# Patient Record
Sex: FEMALE | Race: OTHER | URBAN - METROPOLITAN AREA
[De-identification: names, ages, dates, MRNs, and addresses within clinical notes are randomized per-mention and may not be internally consistent; named-entity substitution may affect disease eponyms.]

---

## 2019-04-10 ENCOUNTER — EMERGENCY (EMERGENCY)
Facility: HOSPITAL | Age: 54
LOS: 1 days | Discharge: HOME | End: 2019-04-10
Attending: EMERGENCY MEDICINE
Payer: MEDICAID

## 2019-04-10 VITALS
TEMPERATURE: 98 F | SYSTOLIC BLOOD PRESSURE: 108 MMHG | HEART RATE: 74 BPM | DIASTOLIC BLOOD PRESSURE: 72 MMHG | RESPIRATION RATE: 17 BRPM | OXYGEN SATURATION: 100 %

## 2019-04-10 VITALS
WEIGHT: 160.94 LBS | SYSTOLIC BLOOD PRESSURE: 112 MMHG | HEIGHT: 62 IN | DIASTOLIC BLOOD PRESSURE: 70 MMHG | OXYGEN SATURATION: 100 % | RESPIRATION RATE: 16 BRPM | HEART RATE: 80 BPM | TEMPERATURE: 98 F

## 2019-04-10 LAB
ALBUMIN SERPL ELPH-MCNC: 3.8 G/DL — SIGNIFICANT CHANGE UP (ref 3.5–5)
ALP SERPL-CCNC: 101 U/L — SIGNIFICANT CHANGE UP (ref 40–120)
ALT FLD-CCNC: 42 U/L DA — SIGNIFICANT CHANGE UP (ref 10–60)
ANION GAP SERPL CALC-SCNC: 6 MMOL/L — SIGNIFICANT CHANGE UP (ref 5–17)
AST SERPL-CCNC: 28 U/L — SIGNIFICANT CHANGE UP (ref 10–40)
BASOPHILS # BLD AUTO: 0.03 K/UL — SIGNIFICANT CHANGE UP (ref 0–0.2)
BASOPHILS NFR BLD AUTO: 0.5 % — SIGNIFICANT CHANGE UP (ref 0–2)
BILIRUB SERPL-MCNC: 0.5 MG/DL — SIGNIFICANT CHANGE UP (ref 0.2–1.2)
BUN SERPL-MCNC: 15 MG/DL — SIGNIFICANT CHANGE UP (ref 7–18)
CALCIUM SERPL-MCNC: 8.5 MG/DL — SIGNIFICANT CHANGE UP (ref 8.4–10.5)
CHLORIDE SERPL-SCNC: 109 MMOL/L — HIGH (ref 96–108)
CK SERPL-CCNC: 118 U/L — SIGNIFICANT CHANGE UP (ref 21–215)
CO2 SERPL-SCNC: 25 MMOL/L — SIGNIFICANT CHANGE UP (ref 22–31)
CREAT SERPL-MCNC: 0.81 MG/DL — SIGNIFICANT CHANGE UP (ref 0.5–1.3)
EOSINOPHIL # BLD AUTO: 0.16 K/UL — SIGNIFICANT CHANGE UP (ref 0–0.5)
EOSINOPHIL NFR BLD AUTO: 2.6 % — SIGNIFICANT CHANGE UP (ref 0–6)
GLUCOSE SERPL-MCNC: 110 MG/DL — HIGH (ref 70–99)
HCT VFR BLD CALC: 40.5 % — SIGNIFICANT CHANGE UP (ref 34.5–45)
HGB BLD-MCNC: 13.3 G/DL — SIGNIFICANT CHANGE UP (ref 11.5–15.5)
IMM GRANULOCYTES NFR BLD AUTO: 0.3 % — SIGNIFICANT CHANGE UP (ref 0–1.5)
LYMPHOCYTES # BLD AUTO: 2.41 K/UL — SIGNIFICANT CHANGE UP (ref 1–3.3)
LYMPHOCYTES # BLD AUTO: 38.9 % — SIGNIFICANT CHANGE UP (ref 13–44)
MCHC RBC-ENTMCNC: 28.5 PG — SIGNIFICANT CHANGE UP (ref 27–34)
MCHC RBC-ENTMCNC: 32.8 GM/DL — SIGNIFICANT CHANGE UP (ref 32–36)
MCV RBC AUTO: 86.7 FL — SIGNIFICANT CHANGE UP (ref 80–100)
MONOCYTES # BLD AUTO: 0.44 K/UL — SIGNIFICANT CHANGE UP (ref 0–0.9)
MONOCYTES NFR BLD AUTO: 7.1 % — SIGNIFICANT CHANGE UP (ref 2–14)
NEUTROPHILS # BLD AUTO: 3.14 K/UL — SIGNIFICANT CHANGE UP (ref 1.8–7.4)
NEUTROPHILS NFR BLD AUTO: 50.6 % — SIGNIFICANT CHANGE UP (ref 43–77)
NRBC # BLD: 0 /100 WBCS — SIGNIFICANT CHANGE UP (ref 0–0)
PLATELET # BLD AUTO: 241 K/UL — SIGNIFICANT CHANGE UP (ref 150–400)
POTASSIUM SERPL-MCNC: 4.1 MMOL/L — SIGNIFICANT CHANGE UP (ref 3.5–5.3)
POTASSIUM SERPL-SCNC: 4.1 MMOL/L — SIGNIFICANT CHANGE UP (ref 3.5–5.3)
PROT SERPL-MCNC: 7.6 G/DL — SIGNIFICANT CHANGE UP (ref 6–8.3)
RBC # BLD: 4.67 M/UL — SIGNIFICANT CHANGE UP (ref 3.8–5.2)
RBC # FLD: 12.9 % — SIGNIFICANT CHANGE UP (ref 10.3–14.5)
SODIUM SERPL-SCNC: 140 MMOL/L — SIGNIFICANT CHANGE UP (ref 135–145)
TROPONIN I SERPL-MCNC: <0.015 NG/ML — SIGNIFICANT CHANGE UP (ref 0–0.04)
WBC # BLD: 6.2 K/UL — SIGNIFICANT CHANGE UP (ref 3.8–10.5)
WBC # FLD AUTO: 6.2 K/UL — SIGNIFICANT CHANGE UP (ref 3.8–10.5)

## 2019-04-10 PROCEDURE — 70450 CT HEAD/BRAIN W/O DYE: CPT

## 2019-04-10 PROCEDURE — 99284 EMERGENCY DEPT VISIT MOD MDM: CPT | Mod: 25

## 2019-04-10 PROCEDURE — 93005 ELECTROCARDIOGRAM TRACING: CPT

## 2019-04-10 PROCEDURE — 70450 CT HEAD/BRAIN W/O DYE: CPT | Mod: 26

## 2019-04-10 PROCEDURE — 82550 ASSAY OF CK (CPK): CPT

## 2019-04-10 PROCEDURE — 85027 COMPLETE CBC AUTOMATED: CPT

## 2019-04-10 PROCEDURE — 36415 COLL VENOUS BLD VENIPUNCTURE: CPT

## 2019-04-10 PROCEDURE — 84484 ASSAY OF TROPONIN QUANT: CPT

## 2019-04-10 PROCEDURE — 80053 COMPREHEN METABOLIC PANEL: CPT

## 2019-04-10 PROCEDURE — 99283 EMERGENCY DEPT VISIT LOW MDM: CPT

## 2019-04-10 RX ORDER — ACETAMINOPHEN 500 MG
975 TABLET ORAL ONCE
Qty: 0 | Refills: 0 | Status: COMPLETED | OUTPATIENT
Start: 2019-04-10 | End: 2019-04-10

## 2019-04-10 RX ADMIN — Medication 975 MILLIGRAM(S): at 14:01

## 2019-04-10 RX ADMIN — Medication 975 MILLIGRAM(S): at 13:55

## 2019-04-10 NOTE — ED ADULT NURSE NOTE - OBJECTIVE STATEMENT
alert and verbally responsive c/o Lt sided headache 7/10, numbness stated symptom started last  Friday worsen last nigh seen by MD, CT completed

## 2019-04-10 NOTE — ED ADULT NURSE NOTE - NSIMPLEMENTINTERV_GEN_ALL_ED
Implemented All Universal Safety Interventions:  [place] to call system. Call bell, personal items and telephone within reach. Instruct patient to call for assistance. Room bathroom lighting operational. Non-slip footwear when patient is off stretcher. Physically safe environment: no spills, clutter or unnecessary equipment. Stretcher in lowest position, wheels locked, appropriate side rails in place.

## 2019-04-10 NOTE — ED PROVIDER NOTE - OBJECTIVE STATEMENT
54 y/o F with no significant PMHx or PSHx presents to the ED with complaints of L facial paralysis x 2 days. Patient notes associated eye irritation with L sided headache and ear pain. Denies any other acute complaints.

## 2019-04-10 NOTE — ED PROVIDER NOTE - PHYSICAL EXAMINATION
L facial paralysis including forehead. Arms and legs with normal strength and sensation. L facial weakness  including forehead. Arms and legs with normal strength and sensation. ambulatory with steady gait, awake alert NAD.

## 2020-04-06 NOTE — ED ADULT NURSE NOTE - NS_NURSE_DISC_ED_ALL_ED_PROVIDEDBY
ED MD

## 2020-08-13 NOTE — ED PROVIDER NOTE - PMH
Cholecalciferol (VITAMIN D3) 1.25 MG (46673 UT) CAPS TAKE 1 CAPSULE BY MOUTH EVERY OTHER WEEK Yes Matthieu Levi MD   predniSONE (DELTASONE) 2.5 MG tablet Take 1 tab daily with 5 mg tab for 7.5 mg Yes Sol Machuca MD   mirtazapine (REMERON) 7.5 MG tablet Take 1 tablet by mouth nightly Yes Matthieu Levi MD   pantoprazole (PROTONIX) 20 MG tablet TAKE 1 TABLET BY MOUTH TWICE DAILY Yes Matthieu Levi MD   rosuvastatin (CRESTOR) 20 MG tablet TAKE 1 TABLET BY MOUTH DAILY Yes Matthieu Levi MD   carvedilol (COREG) 6.25 MG tablet Take 1 tablet by mouth 2 times daily Yes Matthieu Levi MD   ondansetron (ZOFRAN) 4 MG tablet TAKE 1 TABLET BY MOUTH EVERY 12 HOURS AS NEEDED FOR NAUSEA OR VOMITING Yes Matthieu Levi MD   buPROPion (WELLBUTRIN XL) 300 MG extended release tablet TAKE 1 TABLET BY MOUTH EVERY MORNING Yes Griselda Mireles MD   predniSONE (DELTASONE) 5 MG tablet Take 3 tablets by mouth daily for 14 days, THEN 2 tablets daily. TAKE 2 TABLETS BY MOUTH DAILY. Patient taking differently: 5mg Yes Matthieu Levi MD   docusate (COLACE, DULCOLAX) 100 MG CAPS Take 100 mg by mouth 2 times daily Yes Najma Casillas DO   clobetasol (TEMOVATE) 0.05 % external solution Apply topically 2 times daily Apply topically 2 times daily. Yes Paz Cordoba MD   acetaminophen 650 MG TABS Take 650 mg by mouth every 6 hours as needed for Pain or Fever (Fever >100.5 F (38 C)). Yes Franci Greer MD       Social History     Tobacco Use    Smoking status: Former Smoker     Packs/day: 0.50     Years: 30.00     Pack years: 15.00     Types: Cigarettes     Start date:      Last attempt to quit: 2014     Years since quittin.8    Smokeless tobacco: Never Used   Substance Use Topics    Alcohol use:  Yes     Alcohol/week: 0.0 standard drinks     Comment: 2 cocktails on weekends    Drug use: No        Past Medical History:   Diagnosis Date    Anxiety     CAD (coronary artery disease)     CHF (congestive heart failure) (HCC)     Depression     DVT of lower extremity (deep venous thrombosis) (HCC)     Falls     Hypertension     Lupus (HCC)     TIA (transient ischemic attack)     x3    Urinary incontinence     UTI (urinary tract infection)        PHYSICAL EXAMINATION:  [ INSTRUCTIONS:  \"[x]\" Indicates a positive item  \"[]\" Indicates a negative item  -- DELETE ALL ITEMS NOT EXAMINED]  Vital Signs: (As obtained by patient/caregiver or practitioner observation)    Blood pressure- as above Heart rate-    Respiratory rate-    Temperature-  Pulse oximetry-     Mental status  [x] Alert and awake  [x] Oriented to person/place/time []Able to follow commands      Pulmonary/Chest: [x] Respiratory effort normal.  [x] No visualized signs of difficulty breathing or respiratory distress        [] Abnormal-     Psychiatric:       [x] Normal Affect [x] No Hallucinations        [] Abnormal-     Other pertinent observable physical exam findings-     ASSESSMENT/PLAN:  1. Essential hypertension, benign  BP is low today. I would like to see the patient. Patient states that she feels okay except for weight gain  I asked the caregiver to recheck blood pressure today and call me with the results. Asked him not to give the patient more Lasix until I see her. We discussed the option of going to the ER but according to patient's caregiver they would like to wait and I am okay with that since I will see her tomorrow. Weight gain may be secondary to mirtazapine in that case we will discuss whether patient is willing to accept weight gain with the benefits of the mirtazapine    2. Acute on chronic heart failure with preserved ejection fraction ([address]  Patient gained 5 pounds. No leg swelling per pt  Will reassess tomorrow to see if there is any fluid retention      No follow-ups on file.     Jorge Alberto Amador is a 66 y.o. female being evaluated by a Virtual Visit (video visit) encounter to address concerns as mentioned above. A caregiver was present when appropriate. Due to this being a TeleHealth encounter (During IVDLN-52 public health emergency), evaluation of the following organ systems was limited: Vitals/Constitutional/EENT/Resp/CV/GI//MS/Neuro/Skin/Heme-Lymph-Imm. Pursuant to the emergency declaration under the [address] [place] [address], [address] [place] [address] and the [employer] Act, this Virtual Visit was conducted with patient's (and/or legal guardian's) consent, to reduce the patient's risk of exposure to COVID-19 and provide necessary medical care. The patient (and/or legal guardian) has also been advised to contact this office for worsening conditions or problems, and seek emergency medical treatment and/or call 911 if deemed necessary. Patient identification was verified at the start of the visit: Yes    Total time spent on this encounter: ~ 5 min    Services were provided through a video synchronous discussion virtually to substitute for in-person clinic visit. Patient and provider were located at their individual homes. --Luciano Starks MD on 8/13/2020 at 8:20 AM    An electronic signature was used to authenticate this note.     BP came latr better  I will see pt tomorrow No pertinent past medical history <<----- Click to add NO pertinent Past Medical History

## 2021-06-14 ENCOUNTER — EMERGENCY (EMERGENCY)
Facility: HOSPITAL | Age: 56
LOS: 1 days | Discharge: HOME | End: 2021-06-14
Attending: STUDENT IN AN ORGANIZED HEALTH CARE EDUCATION/TRAINING PROGRAM
Payer: MEDICAID

## 2021-06-14 VITALS
TEMPERATURE: 98 F | RESPIRATION RATE: 16 BRPM | DIASTOLIC BLOOD PRESSURE: 58 MMHG | OXYGEN SATURATION: 96 % | WEIGHT: 164.91 LBS | HEART RATE: 82 BPM | SYSTOLIC BLOOD PRESSURE: 141 MMHG | HEIGHT: 63 IN

## 2021-06-14 LAB
APPEARANCE UR: CLEAR — SIGNIFICANT CHANGE UP
BILIRUB UR-MCNC: NEGATIVE — SIGNIFICANT CHANGE UP
COLOR SPEC: YELLOW — SIGNIFICANT CHANGE UP
DIFF PNL FLD: ABNORMAL
GLUCOSE UR QL: NEGATIVE — SIGNIFICANT CHANGE UP
HCG UR QL: NEGATIVE — SIGNIFICANT CHANGE UP
KETONES UR-MCNC: NEGATIVE — SIGNIFICANT CHANGE UP
LEUKOCYTE ESTERASE UR-ACNC: ABNORMAL
NITRITE UR-MCNC: NEGATIVE — SIGNIFICANT CHANGE UP
PH UR: 5 — SIGNIFICANT CHANGE UP (ref 5–8)
PROT UR-MCNC: NEGATIVE — SIGNIFICANT CHANGE UP
SP GR SPEC: 1.01 — SIGNIFICANT CHANGE UP (ref 1.01–1.02)
UROBILINOGEN FLD QL: NEGATIVE — SIGNIFICANT CHANGE UP

## 2021-06-14 PROCEDURE — 99284 EMERGENCY DEPT VISIT MOD MDM: CPT

## 2021-06-14 RX ORDER — CEPHALEXIN 500 MG
1 CAPSULE ORAL
Qty: 14 | Refills: 0
Start: 2021-06-14 | End: 2021-06-20

## 2021-06-14 NOTE — ED PROVIDER NOTE - CARE PLAN
Principal Discharge DX:	Urinary tract infection with hematuria, site unspecified  Secondary Diagnosis:	Rash and nonspecific skin eruption

## 2021-06-14 NOTE — ED ADULT TRIAGE NOTE - CCCP TRG CHIEF CMPLNT
c/o back pain /rashes in her abdomen after getting her 2nd vaccine  PFEIZER/pain, low back c/o back pain /rashes in her abdomen after getting her 2nd dose of vaccine PFIZER  ON 5 /8/2021/pain, low back

## 2021-06-14 NOTE — ED PROVIDER NOTE - PATIENT PORTAL LINK FT
You can access the FollowMyHealth Patient Portal offered by [facility] by registering at the following website: [email or web address]. By joining [facility]’s FollowMyHealth portal, you will also be able to view your health information using other applications (apps) compatible with our system.

## 2021-06-14 NOTE — ED PROVIDER NOTE - NS ED ROS FT
(+) rash, low back pain  (-) fevers, chills  (-) dizziness, lightheadedness, vision changes  (-) neck pain, stiffness  (-) cp, palpitations  (-) sob, cough, hemoptysis  (-) abd pain, n/v/d/c   (-) urinary sxs  (-) weakness, paresthesias

## 2021-06-14 NOTE — ED PROVIDER NOTE - PROGRESS NOTE DETAILS
Moderate bacteria on UA, +leuks.  Will treat for UTI. Explained and educated patient on variety of post-vaccine rashes that have been reported at this time. patient instructed to report to national vaccine reaction.  no indication treatment for shingles at this time given not c/w shingles appearance, unchanged for 1 month  instructed for otc topical cream, pmd and derm f/up. patient and family understand and agree with plan. rx for abx sent.

## 2021-06-14 NOTE — ED PROVIDER NOTE - OBJECTIVE STATEMENT
56yo F no significant pmhx p/w rash to abd and low back pain x weeks. Patient states that she received second dose Pfizer vaccine 5/8 and has been experiencing intermittent low back pain and abd rash since then. patient states that she had tingling in R lower abd region of skin w pain and rash since 5/17, went to pmd and was informed it was a reaction to vaccine, has not followed up. patient states that pain and tingling subsided approx 3 weeks ago and rash has been unchanged, minimally pruritic, w moderate improvement in past 3 weeks. patient has not taken anything for the rash or back pain otherwise. patient denies f/c, dizziness, vision changes, neck pain/stiffness, cp, sob, abd pain, n/v/d/c, weakness, urinary sxs.

## 2021-06-14 NOTE — ED ADULT TRIAGE NOTE - CHIEF COMPLAINT QUOTE
c/o back pain /rashes in her abdomen after getting her 2ND VACCINE MAY8 2021 c/o back pain /rashes in her abdomen after getting her 2ND VACCINE PFIZER MAY8 2021

## 2021-06-14 NOTE — ED PROVIDER NOTE - PHYSICAL EXAMINATION
Sohrawardy:   VS reviewed  NAD, not ill-appearing  aaox3  nc/at, neck rom normal  rrr, s1s2  normal resp effort  abdomen soft, nd/nt   no CVA tenderness b/l  +urticaria, 3 small individual papules, R periumbilical region, 3cm, no scaling, peeling, bleeding, nontender, no vesicles, no streaking, no pain or erythema in surrounding region  no neuro deficits

## 2021-06-14 NOTE — ED ADULT NURSE NOTE - OBJECTIVE STATEMENT
54 yo female presents to the ED c/o back pain and rashes on her abdomen x 2 weeks. Patient is unable to sleep because of the pain. Patient verbalized that she had her 2nd dose of COVID-19 vaccine (Pfizer) on 05/08/2021.

## 2021-06-14 NOTE — ED ADULT NURSE NOTE - CCCP TRG CHIEF CMPLNT
c/o back pain /rashes in her abdomen after getting her 2nd dose of vaccine PFIZER  ON 5 /8/2021/pain, low back

## 2021-06-14 NOTE — ED PROVIDER NOTE - CLINICAL SUMMARY MEDICAL DECISION MAKING FREE TEXT BOX
56yo F no significant pmhx, no immunosuppression p/w rash to abd s/p vaccine 5/8 for covid (pfizer) and low back pain  UA + for leuks and moderate bacteria c/w UTI  no CVA tenderness, no f/c, n/v - unlikely pyelo given h&p, will treat  though endorsing initial tingling/pain last month before rash and considered shingles, presentation of rash not c/w shingles, no vesicles, no ttp, unchanged, nonpruritic, nontender rash for 1 month - undifferentiated at this time and no indicated for treatment/steroids or antivirals given ongoing for >3 weeks  patient otherwise well appearing, no s/s systemic infection or pathology requiring emergent intervention

## 2021-06-15 LAB
CULTURE RESULTS: SIGNIFICANT CHANGE UP
SPECIMEN SOURCE: SIGNIFICANT CHANGE UP

## 2022-04-10 ENCOUNTER — TRANSCRIPTION ENCOUNTER (OUTPATIENT)
Age: 57
End: 2022-04-10

## 2022-06-30 PROBLEM — Z78.9 OTHER SPECIFIED HEALTH STATUS: Chronic | Status: ACTIVE | Noted: 2019-04-10

## 2022-11-16 PROBLEM — Z00.00 ENCOUNTER FOR PREVENTIVE HEALTH EXAMINATION: Status: ACTIVE | Noted: 2022-11-16

## 2022-11-17 ENCOUNTER — APPOINTMENT (OUTPATIENT)
Dept: NEUROLOGY | Facility: CLINIC | Age: 57
End: 2022-11-17

## 2022-11-17 VITALS — DIASTOLIC BLOOD PRESSURE: 54 MMHG | SYSTOLIC BLOOD PRESSURE: 122 MMHG | HEART RATE: 83 BPM

## 2022-11-17 DIAGNOSIS — H93.12 TINNITUS, LEFT EAR: ICD-10-CM

## 2022-11-17 DIAGNOSIS — H91.92 UNSPECIFIED HEARING LOSS, LEFT EAR: ICD-10-CM

## 2022-11-17 DIAGNOSIS — G43.009 MIGRAINE W/OUT AURA, NOT INTRACTABLE, W/OUT STATUS MIGRAINOSUS: ICD-10-CM

## 2022-11-17 PROCEDURE — 99204 OFFICE O/P NEW MOD 45 MIN: CPT

## 2022-11-17 NOTE — HISTORY OF PRESENT ILLNESS
[FreeTextEntry1] : Mrs. Geronimo Larson is a 57-year-old right-handed patient who was referred for neurologic evaluation at your kind suggestion.\par \par Mrs. Larson suffers from episodic headaches for about 20 years.  Her headaches are sometimes prolonged lasting days but in others are easily relieved with use of acetaminophen.  There are no food or environmental precipitants.  Her sister suffers from migraine.  She describes left hemicranial throbbing headaches accompanied by nausea, vomiting, photo and phonophobia.  She denies complicated symptomatology.  She underwent a CT of the brain in 2019 which was unremarkable.\par \par She has been experiencing left ear tinnitus and hearing loss for about 2 weeks.  She denies vertigo.  She denies cognitive, visual, swallowing, motor, sensory, gait or sphincteric difficulties.\par \par Past surgical history is notable for  section.  There is no history of hypertension, diabetes, hyperlipidemia, cardiac, pulmonary, renal, hepatic, gastrointestinal, thyroid, hematologic or cerebrovascular disease.  She has no allergies.  She takes acetaminophen as needed.  She is a non-smoker and social drinker.  She is  and works at [employer].  Family history is notable for a mother with lung cancer, a father with heart disease and a sister with migraine and breast cancer.

## 2022-11-17 NOTE — REVIEW OF SYSTEMS
[Migraine Headache] : migraine headaches [Negative] : Heme/Lymph [FreeTextEntry4] : Left-sided tinnitus

## 2022-11-17 NOTE — PHYSICAL EXAM
[FreeTextEntry1] : Constitutional:  Patient was well-developed, well-nourished and in no acute distress. \par \par Head:  Normocephalic, atraumatic. Tympanic membranes were clear.  Temporal artery pulses were full.\par \par Neck:  Supple with full range of motion. \par \par Cardiovascular:  Cardiac rhythm was regular without murmur. There were no carotid bruits. Peripheral pulses were full and symmetric. \par \par Respiratory:  Lungs were clear. \par \par Abdomen:  Soft and nontender. \par \par Spine:  Nontender. \par \par Skin:  There were no rashes. \par \par NEUROLOGICAL EXAMINATION:\par \par Mental Status: Patient was alert and oriented. Speech was fluent. There was no dysarthria. \par \par Cranial Nerves: \par \par II: She could finger count bilaterally. Pupils were equal and reactive. Visual fields were full. Funduscopic examination was normal. \par \par III, IV, VI:  Eye movements were full without nystagmus. \par \par V: Facial sensation was intact. \par \par VII: Facial strength was normal. \par \par VIII: Hearing was diminished in the left ear.\par \par IX, X: Palatal movement was normal. Phonation was normal. \par \par XI: Sternocleidomastoids and trapezii were normal. \par \par XII: Tongue was midline and movements normal. There was no lingual atrophy or fasciculations. \par \par Motor Examination: Muscle bulk, tone and strength were normal. \par \par Sensory Examination: Pinprick, vibration and joint position sense were intact. \par \par Reflexes: DTRs were 2+ throughout. \par \par Plantar Responses: Plantar responses were flexor. \par \par Coordination/Cerebellar Function: There was no dysmetria on finger to nose or heel to shin testing. \par \par Gait/Stance: Gait and tandem were normal. Romberg was negative.\par

## 2022-11-17 NOTE — CONSULT LETTER
[Dear  ___] : Dear  [unfilled], [Consult Letter:] : I had the pleasure of evaluating your patient, [unfilled]. [Please see my note below.] : Please see my note below. [Consult Closing:] : Thank you very much for allowing me to participate in the care of this patient.  If you have any questions, please do not hesitate to contact me. [Sincerely,] : Sincerely, [FreeTextEntry3] : Jefferson Vaughan MD\par

## 2022-11-17 NOTE — ASSESSMENT
[FreeTextEntry1] : Mrs. Larson is a 57-year-old who suffers from episodic hemicranial headaches which seem most consistent with common migraine.  More recently, she has been experiencing left-sided tinnitus and mild hearing loss.  She is scheduled to see an otolaryngologist in a few weeks.\par \par In view of her asymmetric hearing loss and hemicranial headaches, I suggested that she undergo an MRI of the brain and IACs with and without contrast.  I offered her symptomatic and prophylactic treatments for migraine but she declines.  She wishes to take acetaminophen as needed.  Further management will depend upon the MRI results, ENT consultation and her clinical course.

## 2023-07-03 ENCOUNTER — APPOINTMENT (OUTPATIENT)
Dept: NEUROLOGY | Facility: CLINIC | Age: 58
End: 2023-07-03

## 2023-09-07 ENCOUNTER — NON-APPOINTMENT (OUTPATIENT)
Age: 58
End: 2023-09-07

## 2024-12-23 NOTE — ED ADULT NURSE NOTE - NS ED NURSE LEVEL OF CONSCIOUSNESS SPEECH
Addended by: RODY DEUTSCH on: 12/23/2024 02:16 PM     Modules accepted: Orders     Speaking Coherently